# Patient Record
Sex: MALE | Race: OTHER | HISPANIC OR LATINO | URBAN - METROPOLITAN AREA
[De-identification: names, ages, dates, MRNs, and addresses within clinical notes are randomized per-mention and may not be internally consistent; named-entity substitution may affect disease eponyms.]

---

## 2024-08-19 ENCOUNTER — EMERGENCY (EMERGENCY)
Facility: HOSPITAL | Age: 29
LOS: 0 days | Discharge: HOME | End: 2024-08-19
Attending: EMERGENCY MEDICINE
Payer: COMMERCIAL

## 2024-08-19 VITALS
SYSTOLIC BLOOD PRESSURE: 131 MMHG | WEIGHT: 164.46 LBS | HEIGHT: 65 IN | HEART RATE: 82 BPM | RESPIRATION RATE: 18 BRPM | DIASTOLIC BLOOD PRESSURE: 81 MMHG

## 2024-08-19 DIAGNOSIS — R21 RASH AND OTHER NONSPECIFIC SKIN ERUPTION: ICD-10-CM

## 2024-08-19 DIAGNOSIS — R30.0 DYSURIA: ICD-10-CM

## 2024-08-19 LAB
APPEARANCE UR: CLEAR — SIGNIFICANT CHANGE UP
BILIRUB UR-MCNC: NEGATIVE — SIGNIFICANT CHANGE UP
COLOR SPEC: YELLOW — SIGNIFICANT CHANGE UP
DIFF PNL FLD: NEGATIVE — SIGNIFICANT CHANGE UP
GLUCOSE UR QL: NEGATIVE MG/DL — SIGNIFICANT CHANGE UP
HIV 1 & 2 AB SERPL IA.RAPID: SIGNIFICANT CHANGE UP
KETONES UR-MCNC: NEGATIVE MG/DL — SIGNIFICANT CHANGE UP
LEUKOCYTE ESTERASE UR-ACNC: NEGATIVE — SIGNIFICANT CHANGE UP
NITRITE UR-MCNC: NEGATIVE — SIGNIFICANT CHANGE UP
PH UR: 6 — SIGNIFICANT CHANGE UP (ref 5–8)
PROT UR-MCNC: NEGATIVE MG/DL — SIGNIFICANT CHANGE UP
SP GR SPEC: 1.02 — SIGNIFICANT CHANGE UP (ref 1–1.03)
SPECIMEN SOURCE: SIGNIFICANT CHANGE UP
SPECIMEN SOURCE: SIGNIFICANT CHANGE UP
UROBILINOGEN FLD QL: 0.2 MG/DL — SIGNIFICANT CHANGE UP (ref 0.2–1)

## 2024-08-19 PROCEDURE — 87661 TRICHOMONAS VAGINALIS AMPLIF: CPT

## 2024-08-19 PROCEDURE — 86703 HIV-1/HIV-2 1 RESULT ANTBDY: CPT

## 2024-08-19 PROCEDURE — 99284 EMERGENCY DEPT VISIT MOD MDM: CPT

## 2024-08-19 PROCEDURE — 99053 MED SERV 10PM-8AM 24 HR FAC: CPT

## 2024-08-19 PROCEDURE — 99283 EMERGENCY DEPT VISIT LOW MDM: CPT

## 2024-08-19 PROCEDURE — 80074 ACUTE HEPATITIS PANEL: CPT

## 2024-08-19 PROCEDURE — 81003 URINALYSIS AUTO W/O SCOPE: CPT

## 2024-08-19 PROCEDURE — 86706 HEP B SURFACE ANTIBODY: CPT

## 2024-08-19 PROCEDURE — 87491 CHLMYD TRACH DNA AMP PROBE: CPT

## 2024-08-19 PROCEDURE — 36415 COLL VENOUS BLD VENIPUNCTURE: CPT

## 2024-08-19 PROCEDURE — 86780 TREPONEMA PALLIDUM: CPT

## 2024-08-19 PROCEDURE — 87591 N.GONORRHOEAE DNA AMP PROB: CPT

## 2024-08-19 PROCEDURE — 87340 HEPATITIS B SURFACE AG IA: CPT

## 2024-08-19 NOTE — ED ADULT TRIAGE NOTE - CHIEF COMPLAINT QUOTE
Pt reports rash to bottom head of penis for one year. Has not seen an MD about this. Pt reports its bump like texture without redness or swelling

## 2024-08-19 NOTE — ED PROVIDER NOTE - NSFOLLOWUPINSTRUCTIONS_ED_ALL_ED_FT
FOLLOW UP WITH A PRIMARY CARE DOCTOR AND A UROLOGIST    Our Emergency Department Referral Coordinators will be reaching out to you in the next 24-48 hours from 9:00am to 5:00pm with a follow up appointment. Please expect a phone call from the hospital in that time frame. If you do not receive a call or if you have any questions or concerns, you can reach them at   [phone] [place] [address]    GENERAL INFORMATION BELOW.     Sexually Transmitted Disease    A sexually transmitted disease (STD) is a disease or infection that may be passed (transmitted) from person to person, usually during sexual activity. This may happen by way of saliva, semen, blood, vaginal mucus, or urine. Symptoms vary depending on the type of STD acquired and may include pain in the groin, discharge, and lesions or a rash. If you are started on an antibiotic take it exactly as instructed. Avoid sexual contact of any kind until cleared by a health care professional. Contact your sexual partner(s) to inform them of your diagnosis so that they may be tested as well.    SEEK IMMEDIATE MEDICAL CARE IF YOU HAVE THE FOLLOWING SYMPTOMS: severe abdominal pain, high fever, nausea/vomiting, or weight loss.

## 2024-08-19 NOTE — ED PROVIDER NOTE - PHYSICAL EXAMINATION
As Follows:  CONST: Well appearing in NAD  EYES: PERRL, EOMI, Sclera and conjunctiva clear.   CARD:  Normal rate and rhythm  RESP: No distress or accessory breathing  GI: Soft, non-tender, non-distended.  : Performed with Dr Webster. Nontender testicles. Nontender rash, without vesicles.   SKIN: Warm, dry, no acute rashes. MMM  NEURO: A&Ox3, No focal deficits. Strength and sensation intact. Steady gait

## 2024-08-19 NOTE — ED PROVIDER NOTE - ADDITIONAL NOTES AND INSTRUCTIONS:
Mr Everett was seen in the Emergency Department on 8/19/24 and can return to school or work by the listed date with activity as tolerated.

## 2024-08-19 NOTE — ED PROVIDER NOTE - OBJECTIVE STATEMENT
Patient is a 29 year old male presents for evaluation of penile rash that is painless and present for about 1 year. He is unsure if it improved or worsened but has not gotten it evaluated before. he was sexually active with a female partner around the time it presented. He admits to dysuria.  He denies any fever, chills, or other complaints.

## 2024-08-19 NOTE — ED PROVIDER NOTE - CLINICAL SUMMARY MEDICAL DECISION MAKING FREE TEXT BOX
Patient here with 1 year of painless penile rash.  Agree with above exam.  Here plan for STD testing and ultimately outpatient urology follow-up.

## 2024-08-19 NOTE — ED ADULT NURSE NOTE - NSFALLUNIVINTERV_ED_ALL_ED
Bed/Stretcher in lowest position, wheels locked, appropriate side rails in place/Call bell, personal items and telephone in reach/Instruct patient to call for assistance before getting out of bed/chair/stretcher/Non-slip footwear applied when patient is off stretcher/[place] to call system/Physically safe environment - no spills, clutter or unnecessary equipment/Purposeful proactive rounding/Room/bathroom lighting operational, light cord in reach

## 2024-08-19 NOTE — ED PROVIDER NOTE - NSPTACCESSSVCSAPPT_ED_ALL_ED
From: James Hollins  To: Meredith Lockhart MD  Sent: 3/5/2019 10:37 AM CST  Subject: Test Results Question    Dear Dr. Josephine Porter: Thank you for your call with the update of my test results and treatment plan.  I have started taking the medicine you prescrib Specialty Care and/or PCP (routine)...

## 2024-08-19 NOTE — ED PROVIDER NOTE - PATIENT PORTAL LINK FT
You can access the FollowMyHealth Patient Portal offered by [facility] by registering at the following website: [email or web address]. By joining [employer]’s FollowMyHealth portal, you will also be able to view your health information using other applications (apps) compatible with our system.

## 2024-08-19 NOTE — ED PROVIDER NOTE - NSPTACCESSSVCSAPPTDETAILS_ED_ALL_ED_FT
please refer for evaluation of penile rash.    please also refer to hand surgery s/c to patient request for referral

## 2024-08-19 NOTE — ED PROVIDER NOTE - CARE PROVIDER_API CALL
Naveed Clements  Orthopaedic Surgery  [address] [place] [address]  Phone: [phone]  Fax: [phone]  Follow Up Time:

## 2024-08-20 LAB
C TRACH RRNA SPEC QL NAA+PROBE: SIGNIFICANT CHANGE UP
HAV IGM SER-ACNC: SIGNIFICANT CHANGE UP
HBV CORE IGM SER-ACNC: SIGNIFICANT CHANGE UP
HBV SURFACE AB SER-ACNC: REACTIVE
HBV SURFACE AG SER-ACNC: SIGNIFICANT CHANGE UP
HBV SURFACE AG SER-ACNC: SIGNIFICANT CHANGE UP
HCV AB S/CO SERPL IA: 0.32 S/CO — SIGNIFICANT CHANGE UP (ref 0–0.99)
HCV AB SERPL-IMP: SIGNIFICANT CHANGE UP
N GONORRHOEA RRNA SPEC QL NAA+PROBE: SIGNIFICANT CHANGE UP
T PALLIDUM AB TITR SER: NEGATIVE — SIGNIFICANT CHANGE UP
T VAGINALIS RRNA SPEC QL NAA+PROBE: SIGNIFICANT CHANGE UP

## 2025-01-14 ENCOUNTER — OUTPATIENT (OUTPATIENT)
Dept: OUTPATIENT SERVICES | Facility: HOSPITAL | Age: 30
LOS: 1 days | End: 2025-01-14

## 2025-01-14 DIAGNOSIS — K02.9 DENTAL CARIES, UNSPECIFIED: ICD-10-CM

## 2025-01-14 PROCEDURE — D0140: CPT

## 2025-01-14 PROCEDURE — D0230: CPT

## 2025-01-14 PROCEDURE — D0330: CPT

## 2025-01-14 PROCEDURE — D7140: CPT

## 2025-01-15 DIAGNOSIS — K02.9 DENTAL CARIES, UNSPECIFIED: ICD-10-CM

## 2025-02-10 ENCOUNTER — EMERGENCY (EMERGENCY)
Facility: HOSPITAL | Age: 30
LOS: 0 days | Discharge: HOME | End: 2025-02-10
Attending: EMERGENCY MEDICINE
Payer: SELF-PAY

## 2025-02-10 VITALS
HEART RATE: 81 BPM | WEIGHT: 145.06 LBS | TEMPERATURE: 98 F | SYSTOLIC BLOOD PRESSURE: 121 MMHG | OXYGEN SATURATION: 100 % | DIASTOLIC BLOOD PRESSURE: 81 MMHG | RESPIRATION RATE: 18 BRPM | HEIGHT: 66 IN

## 2025-02-10 DIAGNOSIS — N48.89 OTHER SPECIFIED DISORDERS OF PENIS: ICD-10-CM

## 2025-02-10 DIAGNOSIS — A60.00 HERPESVIRAL INFECTION OF UROGENITAL SYSTEM, UNSPECIFIED: ICD-10-CM

## 2025-02-10 PROCEDURE — 99284 EMERGENCY DEPT VISIT MOD MDM: CPT

## 2025-02-10 RX ORDER — VALACYCLOVIR 1000 MG/1
1 TABLET ORAL
Qty: 21 | Refills: 0
Start: 2025-02-10 | End: 2025-02-16

## 2025-02-10 NOTE — ED PROVIDER NOTE - NSFOLLOWUPINSTRUCTIONS_ED_ALL_ED_FT
Our Emergency Department Referral Coordinators will be reaching out to you in the next 24-48 hours from 9:00am to 5:00pm with a follow up INFECTIOUS DISEASE appointment. Please expect a phone call from the hospital in that time frame. If you do not receive a call or if you have any questions or concerns, you can reach them at [phone].

## 2025-02-10 NOTE — ED PROVIDER NOTE - CLINICAL SUMMARY MEDICAL DECISION MAKING FREE TEXT BOX
This patient presents with penile pain possibly consistent with possible STI. Differential includes simple cystitis, pyelonephritis, epididymitis. Based on history and physical no signs of epididymitis or orchitis or pyelonephritis at this time. Will refer to ID. Return precautions discussed.

## 2025-02-10 NOTE — ED ADULT NURSE NOTE - NSFALLUNIVINTERV_ED_ALL_ED
Bed/Stretcher in lowest position, wheels locked, appropriate side rails in place/Call bell, personal items and telephone in reach/Instruct patient to call for assistance before getting out of bed/chair/stretcher/Non-slip footwear applied when patient is off stretcher/[place] to call system/Physically safe environment - no spills, clutter or unnecessary equipment/Purposeful proactive rounding/Room/bathroom lighting operational, light cord in reach

## 2025-02-10 NOTE — ED PROVIDER NOTE - OBJECTIVE STATEMENT
29-year-old male with a past medical history of genital herpes presents c/o genital herpes flare. Patient states to have experience discomfort and noted bumps to the area for the past year.  Patient requesting valacyclovir. pt denies any other symptoms including fevers, chill, headache, recent illness/travel, cough, abdominal pain, chest pain, or SOB.

## 2025-02-10 NOTE — ED PROVIDER NOTE - ATTENDING APP SHARED VISIT CONTRIBUTION OF CARE
I have reviewed and agree with the mid-level note, except as documented in my note below.    29-year-old male states was previously diagnosed with genital herpes, now presents with slight discomfort around the base of his corona for approximately 1 year, states has "bumps "there for some time which have not changed in size or character, requesting trial of valacyclovir, denies fever, abdominal or back pain, n/v/d, change in bowel habits, urinary sx, swelling, masses, penile d/c or other associated complaints at present.   Old chart reviewed. I have reviewed and agree with the initial nursing note, except as documented in my note.    VSS, awake, alert, non-toxic. The pt gave verbal consent and was examined with a chaperone (TANIYA Palomino); external genitalia normal, skin normal, tenderness to palpation not present, testes descended bilaterally, no lesions or discoloration appreciated, inguinal nodes are neither enlarged nor painful, no abnormalities noted in the urethra, epididymis appears normal bilaterally, no urethral discharge, no skin breakdown, no crepitus, warmth, erythema, induration, fluctuance, lymphangitis, purulence, clear speech and steady gait.

## 2025-02-10 NOTE — ED PROVIDER NOTE - PATIENT PORTAL LINK FT
You can access the FollowMyHealth Patient Portal offered by [facility] by registering at the following website: [email or web address]. By joining [employer]’s FollowMyHealth portal, you will also be able to view your health information using other applications (apps) compatible with our system.

## 2025-02-11 NOTE — CHART NOTE - NSCHARTNOTEFT_GEN_A_CORE
pt prefers mornings, open availability, appt scheduled on 2/13 @ 9:30am @ [address] Williamson, pt aware 2/11 - DK (ID Referral)

## 2025-02-13 ENCOUNTER — NON-APPOINTMENT (OUTPATIENT)
Age: 30
End: 2025-02-13

## 2025-02-13 PROBLEM — Z00.00 ENCOUNTER FOR PREVENTIVE HEALTH EXAMINATION: Status: ACTIVE | Noted: 2025-02-13

## 2025-02-19 ENCOUNTER — NON-APPOINTMENT (OUTPATIENT)
Age: 30
End: 2025-02-19

## 2025-02-20 ENCOUNTER — TRANSCRIPTION ENCOUNTER (OUTPATIENT)
Age: 30
End: 2025-02-20

## 2025-02-20 ENCOUNTER — APPOINTMENT (OUTPATIENT)
Dept: INFECTIOUS DISEASE | Facility: CLINIC | Age: 30
End: 2025-02-20

## 2025-02-26 ENCOUNTER — OUTPATIENT (OUTPATIENT)
Dept: OUTPATIENT SERVICES | Facility: HOSPITAL | Age: 30
LOS: 1 days | End: 2025-02-26
Payer: COMMERCIAL

## 2025-02-26 ENCOUNTER — APPOINTMENT (OUTPATIENT)
Dept: INFECTIOUS DISEASE | Facility: CLINIC | Age: 30
End: 2025-02-26

## 2025-02-26 VITALS
SYSTOLIC BLOOD PRESSURE: 119 MMHG | WEIGHT: 161 LBS | HEART RATE: 55 BPM | OXYGEN SATURATION: 98 % | TEMPERATURE: 97.8 F | BODY MASS INDEX: 25.88 KG/M2 | RESPIRATION RATE: 14 BRPM | DIASTOLIC BLOOD PRESSURE: 81 MMHG | HEIGHT: 66 IN

## 2025-02-26 DIAGNOSIS — Z11.3 ENCOUNTER FOR SCREENING FOR INFECTIONS WITH A PREDOMINANTLY SEXUAL MODE OF TRANSMISSION: ICD-10-CM

## 2025-02-26 DIAGNOSIS — Z00.00 ENCOUNTER FOR GENERAL ADULT MEDICAL EXAMINATION WITHOUT ABNORMAL FINDINGS: ICD-10-CM

## 2025-02-26 LAB
HCV AB SER QL: NONREACTIVE
HCV S/CO RATIO: 0.05 COI
T PALLIDUM AB SER QL IA: NEGATIVE

## 2025-02-26 PROCEDURE — 99204 OFFICE O/P NEW MOD 45 MIN: CPT

## 2025-02-27 LAB
C TRACH RRNA SPEC QL NAA+PROBE: NOT DETECTED
HBV CORE IGG+IGM SER QL: NONREACTIVE
HBV SURFACE AB SER QL: REACTIVE
HBV SURFACE AG SER QL: NONREACTIVE
HEPATITIS A IGG ANTIBODY: NONREACTIVE
HIV1+2 AB SPEC QL IA.RAPID: NONREACTIVE
HSV 1+2 IGG SER IA-IMP: NEGATIVE
HSV 1+2 IGG SER IA-IMP: POSITIVE
HSV 1+2 IGG SER IA-IMP: POSITIVE
HSV1 IGG SER QL: 11.8 INDEX
HSV1 IGG SER QL: 11.8 INDEX
HSV2 IGG SER QL: 0.84 INDEX
N GONORRHOEA RRNA SPEC QL NAA+PROBE: NOT DETECTED
SOURCE AMPLIFICATION: NORMAL

## 2025-03-24 ENCOUNTER — OUTPATIENT (OUTPATIENT)
Dept: OUTPATIENT SERVICES | Facility: HOSPITAL | Age: 30
LOS: 1 days | End: 2025-03-24
Payer: COMMERCIAL

## 2025-03-24 ENCOUNTER — APPOINTMENT (OUTPATIENT)
Dept: INFECTIOUS DISEASE | Facility: CLINIC | Age: 30
End: 2025-03-24

## 2025-03-24 DIAGNOSIS — Z00.00 ENCOUNTER FOR GENERAL ADULT MEDICAL EXAMINATION WITHOUT ABNORMAL FINDINGS: ICD-10-CM

## 2025-03-24 DIAGNOSIS — Z20.6 CONTACT WITH AND (SUSPECTED) EXPOSURE TO HUMAN IMMUNODEFICIENCY VIRUS [HIV]: ICD-10-CM

## 2025-03-24 PROCEDURE — 98014 SYNCH AUDIO-ONLY EST MOD 30: CPT

## 2025-04-16 ENCOUNTER — APPOINTMENT (OUTPATIENT)
Dept: INFECTIOUS DISEASE | Facility: CLINIC | Age: 30
End: 2025-04-16